# Patient Record
Sex: MALE | URBAN - METROPOLITAN AREA
[De-identification: names, ages, dates, MRNs, and addresses within clinical notes are randomized per-mention and may not be internally consistent; named-entity substitution may affect disease eponyms.]

---

## 2020-07-16 ENCOUNTER — OFFICE VISIT (OUTPATIENT)
Dept: SLEEP MEDICINE | Age: 45
End: 2020-07-16
Payer: MEDICAID

## 2020-07-16 VITALS
BODY MASS INDEX: 30.62 KG/M2 | SYSTOLIC BLOOD PRESSURE: 118 MMHG | HEIGHT: 73 IN | HEART RATE: 111 BPM | WEIGHT: 231 LBS | OXYGEN SATURATION: 97 % | TEMPERATURE: 98.1 F | RESPIRATION RATE: 16 BRPM | DIASTOLIC BLOOD PRESSURE: 82 MMHG

## 2020-07-16 PROCEDURE — 99204 OFFICE O/P NEW MOD 45 MIN: CPT | Performed by: PSYCHIATRY & NEUROLOGY

## 2020-07-16 PROCEDURE — G8427 DOCREV CUR MEDS BY ELIG CLIN: HCPCS | Performed by: PSYCHIATRY & NEUROLOGY

## 2020-07-16 PROCEDURE — G8417 CALC BMI ABV UP PARAM F/U: HCPCS | Performed by: PSYCHIATRY & NEUROLOGY

## 2020-07-16 PROCEDURE — 4004F PT TOBACCO SCREEN RCVD TLK: CPT | Performed by: PSYCHIATRY & NEUROLOGY

## 2020-07-16 RX ORDER — BUDESONIDE AND FORMOTEROL FUMARATE DIHYDRATE 160; 4.5 UG/1; UG/1
2 AEROSOL RESPIRATORY (INHALATION) 2 TIMES DAILY
COMMUNITY

## 2020-07-16 RX ORDER — FUROSEMIDE 20 MG/1
20 TABLET ORAL 3 TIMES DAILY
COMMUNITY
Start: 2020-06-19

## 2020-07-16 RX ORDER — METOPROLOL SUCCINATE 25 MG/1
25 TABLET, EXTENDED RELEASE ORAL DAILY
COMMUNITY
Start: 2020-07-15

## 2020-07-16 RX ORDER — HYDROCODONE BITARTRATE AND ACETAMINOPHEN 10; 325 MG/1; MG/1
TABLET ORAL
COMMUNITY
Start: 2020-05-12

## 2020-07-16 RX ORDER — SPIRONOLACTONE 25 MG/1
25 TABLET ORAL DAILY
COMMUNITY
Start: 2020-07-14

## 2020-07-16 RX ORDER — ALBUTEROL SULFATE 90 UG/1
2 AEROSOL, METERED RESPIRATORY (INHALATION) EVERY 4 HOURS PRN
COMMUNITY
Start: 2020-05-12

## 2020-07-16 ASSESSMENT — SLEEP AND FATIGUE QUESTIONNAIRES
HOW LIKELY ARE YOU TO NOD OFF OR FALL ASLEEP WHILE WATCHING TV: 3
HOW LIKELY ARE YOU TO NOD OFF OR FALL ASLEEP WHEN YOU ARE A PASSENGER IN A CAR FOR AN HOUR WITHOUT A BREAK: 2
HOW LIKELY ARE YOU TO NOD OFF OR FALL ASLEEP WHILE SITTING INACTIVE IN A PUBLIC PLACE: 1
HOW LIKELY ARE YOU TO NOD OFF OR FALL ASLEEP WHILE SITTING AND TALKING TO SOMEONE: 0
HOW LIKELY ARE YOU TO NOD OFF OR FALL ASLEEP WHILE SITTING AND READING: 3
NECK CIRCUMFERENCE (INCHES): 16
HOW LIKELY ARE YOU TO NOD OFF OR FALL ASLEEP WHILE LYING DOWN TO REST IN THE AFTERNOON WHEN CIRCUMSTANCES PERMIT: 1
HOW LIKELY ARE YOU TO NOD OFF OR FALL ASLEEP IN A CAR, WHILE STOPPED FOR A FEW MINUTES IN TRAFFIC: 0
HOW LIKELY ARE YOU TO NOD OFF OR FALL ASLEEP WHILE SITTING QUIETLY AFTER LUNCH WITHOUT ALCOHOL: 0
ESS TOTAL SCORE: 10

## 2020-07-16 ASSESSMENT — ENCOUNTER SYMPTOMS
CHOKING: 1
WHEEZING: 1
SHORTNESS OF BREATH: 1
APNEA: 1
ALLERGIC/IMMUNOLOGIC NEGATIVE: 1
COUGH: 1
GASTROINTESTINAL NEGATIVE: 1

## 2020-07-16 NOTE — PATIENT INSTRUCTIONS
Orders Placed This Encounter   Procedures    Baseline Diagnostic Sleep Study     Standing Status:   Future     Standing Expiration Date:   7/16/2021     Order Specific Question:   Adult or Pediatric     Answer:   Adult Study (>7 Years)     Order Specific Question:   Location For Sleep Study     Answer:   [facility]     Order Specific Question:   Select Sleep Lab Location     Answer:   [facility]    Sleep Study with PAP Titration     Standing Status:   Future     Standing Expiration Date:   7/16/2021     Order Specific Question:   Sleep Study Titration Type     Answer:   CPAP     Order Specific Question:   Location For Sleep Study     Answer:   [facility]     Order Specific Question:   Select Sleep Lab Location     Answer:   [facility]      Patient Education        Sleep Apnea: Care Instructions  Your Care Instructions     Sleep apnea means that you frequently stop breathing for 10 seconds or longer during sleep. It can be mild to severe, based on the number of times an hour that you stop breathing or have slowed breathing. Blocked or narrowed airways in your nose, mouth, or throat can cause sleep apnea. Your airway can become blocked when your throat muscles and tongue relax during sleep. You can treat sleep apnea at home by making lifestyle changes. You also can use a CPAP breathing machine that keeps tissues in the throat from blocking your airway. Or your doctor may suggest that you use a breathing device while you sleep. It helps keep your airway open. This could be a device that you put in your mouth. In some cases, surgery may be needed to remove enlarged tissues in the throat. Follow-up care is a key part of your treatment and safety. Be sure to make and go to all appointments, and call your doctor if you are having problems. It's also a good idea to know your test results and keep a list of the medicines you take. How can you care for yourself at home? · Lose weight, if needed.  It may reduce the number of times you stop breathing or have slowed breathing. · Sleep on your side. It may stop mild apnea. If you tend to roll onto your back, sew a pocket in the back of your pajama top. Put a tennis ball into the pocket, and stitch the pocket shut. This will help keep you from sleeping on your back. · Avoid alcohol and medicines such as sleeping pills and sedatives before bed. · Do not smoke. Smoking can make sleep apnea worse. If you need help quitting, talk to your doctor about stop-smoking programs and medicines. These can increase your chances of quitting for good. · Prop up the head of your bed 4 to 6 inches by putting bricks under the legs of the bed. · Treat breathing problems, such as a stuffy nose, caused by a cold or allergies. · Try a continuous positive airway pressure (CPAP) breathing machine if your doctor recommends it. The machine keeps your airway open when you sleep. · If CPAP does not work for you, ask your doctor if you can try other breathing machines. A bilevel positive airway pressure machine uses one type of air pressure for breathing in and another type for breathing out. Another device raises or lowers air pressure as needed while you breathe. · Talk to your doctor if:  ? Your nose feels dry or bleeds when you use one of these machines. You may need to increase moisture in the air. A humidifier may help. ? Your nose is runny or stuffy from using a breathing machine. Decongestants or a corticosteroid nasal spray may help. ? You are sleepy during the day and it gets in the way of the normal things you do. Do not drive when you are drowsy. When should you call for help? Watch closely for changes in your health, and be sure to contact your doctor if:  · You still have sleep apnea even though you have made lifestyle changes. · You are thinking of trying a device such as CPAP. · You are having problems using a CPAP or similar machine. Where can you learn more?   Go to [email or web address] and sign in to your [employer] account. Enter P256 in the [employer] box to learn more about \"Sleep Apnea: Care Instructions. \"     If you do not have an account, please click on the \"Sign Up Now\" link. Current as of: February 24, 2020               Content Version: 12.5  © 6264-0964 [employer]. Care instructions adapted under license by [facility] ([facility]). If you have questions about a medical condition or this instruction, always ask your healthcare professional. [address] any warranty or liability for your use of this information.

## 2020-07-16 NOTE — PROGRESS NOTES
years and tried, has lost 40 pounds in the last 5 years,      DOT/CDL - N/A  ALESIA/'jeri - N/A  The patient HTN and heart disease are stable. Previous Report(s) Reviewed: historical medical records       Social History     Socioeconomic History    Marital status: Unknown     Spouse name: Not on file    Number of children: Not on file    Years of education: Not on file    Highest education level: Not on file   Occupational History    Not on file   Social Needs    Financial resource strain: Not on file    Food insecurity     Worry: Not on file     Inability: Not on file    Transportation needs     Medical: Not on file     Non-medical: Not on file   Tobacco Use    Smoking status: Not on file   Substance and Sexual Activity    Alcohol use: Not on file    Drug use: Not on file    Sexual activity: Not on file   Lifestyle    Physical activity     Days per week: Not on file     Minutes per session: Not on file    Stress: Not on file   Relationships    Social connections     Talks on phone: Not on file     Gets together: Not on file     Attends Christian service: Not on file     Active member of club or organization: Not on file     Attends meetings of clubs or organizations: Not on file     Relationship status: Not on file    Intimate partner violence     Fear of current or ex partner: Not on file     Emotionally abused: Not on file     Physically abused: Not on file     Forced sexual activity: Not on file   Other Topics Concern    Not on file   Social History Narrative    Not on file       Prior to Admission medications    Medication Sig Start Date End Date Taking?  Authorizing Provider   albuterol sulfate  (90 Base) MCG/ACT inhaler Inhale 2 puffs into the lungs every 4 hours as needed 5/12/20  Yes Historical Provider, MD   furosemide (LASIX) 20 MG tablet Take 20 mg by mouth 3 times daily 6/19/20  Yes Historical Provider, MD   spironolactone (ALDACTONE) 25 MG tablet Take 25 mg by mouth daily 7/14/20  Yes Historical Provider, MD   HYDROcodone-acetaminophen [facility])  MG per tablet One tab for pain daily prn 5/12/20  Yes Historical Provider, MD   metoprolol succinate (TOPROL XL) 25 MG extended release tablet Take 25 mg by mouth daily 7/15/20  Yes Historical Provider, MD   budesonide-formoterol (SYMBICORT) 160-4.5 MCG/ACT AERO Inhale 2 puffs into the lungs 2 times daily    Historical Provider, MD       Allergies as of 07/16/2020    (No Known Allergies)       Patient Active Problem List   Diagnosis    HTN (hypertension)    Coronary atherosclerosis       No past medical history on file. No past surgical history on file. No family history on file. Review of Systems   Constitutional: Positive for diaphoresis and fatigue. HENT: Positive for congestion. Respiratory: Positive for apnea, cough, choking, shortness of breath and wheezing. Cardiovascular: Positive for leg swelling. Gastrointestinal: Negative. Endocrine: Positive for cold intolerance and heat intolerance. Genitourinary: Positive for frequency. Musculoskeletal: Positive for arthralgias and myalgias. Allergic/Immunologic: Negative. Neurological: Negative. Hematological: Negative. Psychiatric/Behavioral: The patient is nervous/anxious. Objective:     Vitals:  Weight BMI Neck circumference    Wt Readings from Last 3 Encounters:   07/16/20 231 lb (104.8 kg)    Body mass index is 30.48 kg/m². Neck circumference: 16     BP HR SaO2   BP Readings from Last 3 Encounters:   07/16/20 118/82    Pulse Readings from Last 3 Encounters:   07/16/20 111    SpO2 Readings from Last 3 Encounters:   07/16/20 97%        The mandibular molar Class :   []1 []2 []3      Mallampati I Airway Classification:   []1 []2 []3 []4        Physical Exam  Vitals signs and nursing note reviewed. Constitutional:       Appearance: Normal appearance. HENT:      Head: Atraumatic.       Nose: Nose normal.      Mouth/Throat: Comments: Mallampati class 2, no retrognathia or hypognathia , normal airflow in bilateral nostrils, no septum deviation , crowded oropharynx with low soft palate, high arched hard palate,no tonsils enlargement. Eyes:      Extraocular Movements: Extraocular movements intact. Neck:      Musculoskeletal: Normal range of motion and neck supple. Cardiovascular:      Rate and Rhythm: Normal rate and regular rhythm. Heart sounds: Normal heart sounds. Pulmonary:      Effort: Pulmonary effort is normal.      Breath sounds: Normal breath sounds. Musculoskeletal: Normal range of motion. Right lower leg: No edema. Left lower leg: No edema. Skin:     General: Skin is warm. Neurological:      General: No focal deficit present. Psychiatric:         Mood and Affect: Mood normal.         Assessment:    Obstructive sleep apnea especially with snoring, snorting,  observed apnea, daytime sleepiness,  Mallampati class of 2 . Diagnosis Orders   1. Obstructive sleep apnea  Baseline Diagnostic Sleep Study    Sleep Study with PAP Titration   2. Essential hypertension  Baseline Diagnostic Sleep Study   3. H/O cardiomyopathy  Baseline Diagnostic Sleep Study    Sleep Study with PAP Titration   4. Class 1 obesity due to excess calories with serious comorbidity and body mass index (BMI) of 30.0 to 30.9 in adult  Baseline Diagnostic Sleep Study     Plan:     Patient was counseled about the pathophysiology of obstructive sleep apnea syndrome and the methods for evaluating its presence and severity. Patient was counseled to avoid driving and other potentially hazardous circumstances if the patient is experiencing excessive sleepiness.   Treatment considerations include the use of nasal CPAP, oral dental appliance or a surgical intervention, which should be based on otolarygologic findings, In the meantime, the patient should be cautioned to avoid the use of alcohol or other depressant medications because

## 2020-08-05 ENCOUNTER — TELEPHONE (OUTPATIENT)
Dept: SLEEP MEDICINE | Age: 45
End: 2020-08-05

## 2020-10-02 ENCOUNTER — OFFICE VISIT (OUTPATIENT)
Dept: PRIMARY CARE CLINIC | Age: 45
End: 2020-10-02
Payer: MEDICAID

## 2020-10-02 PROCEDURE — G8428 CUR MEDS NOT DOCUMENT: HCPCS | Performed by: NURSE PRACTITIONER

## 2020-10-02 PROCEDURE — 99211 OFF/OP EST MAY X REQ PHY/QHP: CPT | Performed by: NURSE PRACTITIONER

## 2020-10-02 PROCEDURE — G8417 CALC BMI ABV UP PARAM F/U: HCPCS | Performed by: NURSE PRACTITIONER

## 2020-10-02 NOTE — PROGRESS NOTES
Patient presented to [facility] drive up clinic for preop testing. Patient was swabbed and given information advising them to remain isolated until procedure date.

## 2020-10-04 LAB — SARS-COV-2, NAA: NOT DETECTED

## 2020-10-07 ENCOUNTER — HOSPITAL ENCOUNTER (OUTPATIENT)
Dept: SLEEP CENTER | Age: 45
Discharge: HOME | End: 2020-10-07
Payer: MEDICAID

## 2020-10-07 PROCEDURE — 95810 POLYSOM 6/> YRS 4/> PARAM: CPT | Performed by: PSYCHIATRY & NEUROLOGY

## 2020-10-07 PROCEDURE — 95810 POLYSOM 6/> YRS 4/> PARAM: CPT

## 2020-10-09 ENCOUNTER — TELEPHONE (OUTPATIENT)
Dept: SLEEP MEDICINE | Age: 45
End: 2020-10-09